# Patient Record
Sex: MALE | Race: OTHER | HISPANIC OR LATINO | URBAN - METROPOLITAN AREA
[De-identification: names, ages, dates, MRNs, and addresses within clinical notes are randomized per-mention and may not be internally consistent; named-entity substitution may affect disease eponyms.]

---

## 2021-08-24 PROBLEM — K64.8 OTHER HEMORRHOIDS: Status: ACTIVE | Noted: 2021-08-24

## 2021-08-24 PROBLEM — K62.5 HEMORRHAGE OF ANUS AND RECTUM: Status: ACTIVE | Noted: 2021-08-24

## 2022-12-30 ENCOUNTER — OFFICE (OUTPATIENT)
Dept: URBAN - METROPOLITAN AREA CLINIC 81 | Facility: CLINIC | Age: 52
End: 2022-12-30

## 2022-12-30 VITALS — WEIGHT: 315 LBS | HEIGHT: 66 IN

## 2022-12-30 DIAGNOSIS — Z80.0 FAMILY HISTORY OF MALIGNANT NEOPLASM OF DIGESTIVE ORGANS: ICD-10-CM

## 2022-12-30 DIAGNOSIS — Z86.010 PERSONAL HISTORY OF COLONIC POLYPS: ICD-10-CM

## 2022-12-30 PROCEDURE — 99203 OFFICE O/P NEW LOW 30 MIN: CPT | Mod: 95

## 2022-12-30 RX ORDER — POLYETHYLENE GLYCOL 3350 17 G/17G
POWDER, FOR SOLUTION ORAL
Qty: 527 | Refills: 0 | Status: ACTIVE
Start: 2022-12-30

## 2022-12-30 NOTE — SERVICENOTES
Telehealth Platform Used:  [employer]
Location of patient: at home
Location of provider: home office with door shut

## 2022-12-30 NOTE — SERVICEHPINOTES
Patient is being seen for a repeat colonoscopy d/t hx of polyps. He is here for surveillance. He is asymptomatic at this time and denies any UGI s/s. States no cardiac hx. Meds reviewed. br br Last colonoscopy was in 2021, he had a large Polyp.br 11/2021 30mm polyp, sessile, referred to Dr. Mckeon 3-4mm polyps in sigmoidbrredundant colonbrrepeat in 1yr